# Patient Record
Sex: MALE | Race: WHITE | NOT HISPANIC OR LATINO | URBAN - METROPOLITAN AREA
[De-identification: names, ages, dates, MRNs, and addresses within clinical notes are randomized per-mention and may not be internally consistent; named-entity substitution may affect disease eponyms.]

---

## 2021-07-19 ENCOUNTER — EMERGENCY (EMERGENCY)
Facility: HOSPITAL | Age: 31
LOS: 0 days | Discharge: HOME | End: 2021-07-19
Attending: EMERGENCY MEDICINE | Admitting: EMERGENCY MEDICINE
Payer: MEDICAID

## 2021-07-19 VITALS
RESPIRATION RATE: 18 BRPM | DIASTOLIC BLOOD PRESSURE: 104 MMHG | OXYGEN SATURATION: 99 % | SYSTOLIC BLOOD PRESSURE: 153 MMHG | TEMPERATURE: 99 F | HEART RATE: 109 BPM
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
## 2021-07-19 DIAGNOSIS — S61.532A PUNCTURE WOUND WITHOUT FOREIGN BODY OF LEFT WRIST, INITIAL ENCOUNTER: ICD-10-CM
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
## 2021-07-19 PROCEDURE — 73100 X-RAY EXAM OF WRIST: CPT | Mod: 26,LT

## 2021-07-19 PROCEDURE — 99284 EMERGENCY DEPT VISIT MOD MDM: CPT

## 2021-07-19 NOTE — ED PROVIDER NOTE - NSFOLLOWUPINSTRUCTIONS_ED_ALL_ED_FT
Patient to be discharged from ED. Any available test results were discussed with patient and/or family. Verbal instructions given, including instructions to return to ED immediately for any new, worsening, or concerning symptoms. Patient endorsed understanding. Written discharge instructions additionally given, including follow-up plan.    keep wound clean and dry .

## 2021-07-19 NOTE — ED ADULT TRIAGE NOTE - CHIEF COMPLAINT QUOTE
left wrist injury as per patient "I was using a staple gun (short staple) about twenty minutes ago and it got into my wrist and I pulled it out".

## 2021-07-19 NOTE — ED PROVIDER NOTE - PATIENT PORTAL LINK FT
You can access the FollowMyHealth Patient Portal offered by [facility] by registering at the following website: [email or web address]. By joining [facility]’s FollowMyHealth portal, you will also be able to view your health information using other applications (apps) compatible with our system.

## 2021-07-19 NOTE — ED PROVIDER NOTE - OBJECTIVE STATEMENT
this 32 yo male presents to ed for evaluation of injury . patient states was using staple gun and staple went into his wrist       phone used

## 2021-07-19 NOTE — ED PROVIDER NOTE - PHYSICAL EXAMINATION
--EXAM--  VITAL SIGNS: I have reviewed vs documented at present.  CONSTITUTIONAL: Well-developed; well-nourished; in no acute distress.   SKIN: left wrist there is puncture wound noted no active bleeding. mild swelling no tenderness to palption     CARD: S1, S2, Regular rate and rhythm.   RESP: No wheezes, rales or rhonchi.

## 2021-07-19 NOTE — ED PROVIDER NOTE - NS ED ROS FT
Review of Systems:  	•	CONSTITUTIONAL - no fever, no diaphoresis, no chills  	•	SKIN - no rash puncture wound left wrist     	•	RESPIRATORY - no shortness of breath, no cough  	•	CARDIAC - no chest pain, no palpitations